# Patient Record
Sex: FEMALE | Race: WHITE | NOT HISPANIC OR LATINO | Employment: FULL TIME | URBAN - METROPOLITAN AREA
[De-identification: names, ages, dates, MRNs, and addresses within clinical notes are randomized per-mention and may not be internally consistent; named-entity substitution may affect disease eponyms.]

---

## 2017-01-06 ENCOUNTER — TRANSCRIBE ORDERS (OUTPATIENT)
Dept: ADMINISTRATIVE | Facility: HOSPITAL | Age: 37
End: 2017-01-06

## 2017-01-06 DIAGNOSIS — M25.861 CYST OF RIGHT KNEE JOINT: Primary | ICD-10-CM

## 2017-01-12 ENCOUNTER — HOSPITAL ENCOUNTER (OUTPATIENT)
Dept: RADIOLOGY | Age: 37
Discharge: HOME | End: 2017-01-12
Attending: FAMILY MEDICINE
Payer: COMMERCIAL

## 2017-01-12 DIAGNOSIS — M25.861 CYST OF RIGHT KNEE JOINT: ICD-10-CM

## 2017-01-12 PROCEDURE — 73721 MRI JNT OF LWR EXTRE W/O DYE: CPT

## 2017-02-21 ENCOUNTER — APPOINTMENT (OUTPATIENT)
Dept: PHYSICAL THERAPY | Facility: MEDICAL CENTER | Age: 37
End: 2017-02-21
Payer: COMMERCIAL

## 2017-02-21 PROCEDURE — 97161 PT EVAL LOW COMPLEX 20 MIN: CPT

## 2017-02-21 PROCEDURE — 97110 THERAPEUTIC EXERCISES: CPT

## 2017-02-21 PROCEDURE — 97140 MANUAL THERAPY 1/> REGIONS: CPT

## 2017-02-27 ENCOUNTER — APPOINTMENT (OUTPATIENT)
Dept: PHYSICAL THERAPY | Facility: MEDICAL CENTER | Age: 37
End: 2017-02-27
Payer: COMMERCIAL

## 2017-02-27 PROCEDURE — 97110 THERAPEUTIC EXERCISES: CPT

## 2017-02-27 PROCEDURE — 97140 MANUAL THERAPY 1/> REGIONS: CPT

## 2017-03-02 ENCOUNTER — APPOINTMENT (OUTPATIENT)
Dept: PHYSICAL THERAPY | Facility: MEDICAL CENTER | Age: 37
End: 2017-03-02
Payer: COMMERCIAL

## 2017-03-02 PROCEDURE — 97140 MANUAL THERAPY 1/> REGIONS: CPT

## 2017-03-02 PROCEDURE — 97110 THERAPEUTIC EXERCISES: CPT

## 2017-03-02 PROCEDURE — 97112 NEUROMUSCULAR REEDUCATION: CPT

## 2017-03-06 ENCOUNTER — APPOINTMENT (OUTPATIENT)
Dept: PHYSICAL THERAPY | Facility: MEDICAL CENTER | Age: 37
End: 2017-03-06
Payer: COMMERCIAL

## 2017-03-06 PROCEDURE — 97110 THERAPEUTIC EXERCISES: CPT

## 2017-03-06 PROCEDURE — 97112 NEUROMUSCULAR REEDUCATION: CPT

## 2017-03-06 PROCEDURE — 97140 MANUAL THERAPY 1/> REGIONS: CPT

## 2017-03-09 ENCOUNTER — APPOINTMENT (OUTPATIENT)
Dept: PHYSICAL THERAPY | Facility: MEDICAL CENTER | Age: 37
End: 2017-03-09
Payer: COMMERCIAL

## 2017-03-09 PROCEDURE — 97110 THERAPEUTIC EXERCISES: CPT

## 2017-03-09 PROCEDURE — 97140 MANUAL THERAPY 1/> REGIONS: CPT

## 2017-03-13 ENCOUNTER — APPOINTMENT (OUTPATIENT)
Dept: PHYSICAL THERAPY | Facility: MEDICAL CENTER | Age: 37
End: 2017-03-13
Payer: COMMERCIAL

## 2017-03-13 PROCEDURE — 97140 MANUAL THERAPY 1/> REGIONS: CPT

## 2017-03-13 PROCEDURE — 97110 THERAPEUTIC EXERCISES: CPT

## 2017-03-16 ENCOUNTER — APPOINTMENT (OUTPATIENT)
Dept: PHYSICAL THERAPY | Facility: MEDICAL CENTER | Age: 37
End: 2017-03-16
Payer: COMMERCIAL

## 2017-03-16 PROCEDURE — 97110 THERAPEUTIC EXERCISES: CPT

## 2017-03-16 PROCEDURE — 97140 MANUAL THERAPY 1/> REGIONS: CPT

## 2017-03-16 PROCEDURE — 97112 NEUROMUSCULAR REEDUCATION: CPT

## 2017-03-20 ENCOUNTER — APPOINTMENT (OUTPATIENT)
Dept: PHYSICAL THERAPY | Facility: MEDICAL CENTER | Age: 37
End: 2017-03-20
Payer: COMMERCIAL

## 2017-03-20 PROCEDURE — 97110 THERAPEUTIC EXERCISES: CPT

## 2017-03-20 PROCEDURE — 97112 NEUROMUSCULAR REEDUCATION: CPT

## 2017-03-20 PROCEDURE — 97140 MANUAL THERAPY 1/> REGIONS: CPT

## 2017-03-23 ENCOUNTER — APPOINTMENT (OUTPATIENT)
Dept: PHYSICAL THERAPY | Facility: MEDICAL CENTER | Age: 37
End: 2017-03-23
Payer: COMMERCIAL

## 2017-03-27 ENCOUNTER — APPOINTMENT (OUTPATIENT)
Dept: PHYSICAL THERAPY | Facility: MEDICAL CENTER | Age: 37
End: 2017-03-27
Payer: COMMERCIAL

## 2017-03-27 ENCOUNTER — ALLSCRIPTS OFFICE VISIT (OUTPATIENT)
Dept: OTHER | Facility: OTHER | Age: 37
End: 2017-03-27

## 2017-03-27 PROCEDURE — 97140 MANUAL THERAPY 1/> REGIONS: CPT

## 2017-03-27 PROCEDURE — 97110 THERAPEUTIC EXERCISES: CPT

## 2017-03-27 PROCEDURE — 97112 NEUROMUSCULAR REEDUCATION: CPT

## 2017-07-14 ENCOUNTER — TRANSCRIBE ORDERS (OUTPATIENT)
Dept: ADMINISTRATIVE | Facility: HOSPITAL | Age: 37
End: 2017-07-14

## 2017-07-14 DIAGNOSIS — R00.2 RAPID PALPITATIONS: Primary | ICD-10-CM

## 2017-07-14 DIAGNOSIS — D21.9 PARASITIC FIBROID: ICD-10-CM

## 2017-07-31 ENCOUNTER — HOSPITAL ENCOUNTER (OUTPATIENT)
Dept: NON INVASIVE DIAGNOSTICS | Facility: CLINIC | Age: 37
Discharge: HOME | End: 2017-07-31
Payer: COMMERCIAL

## 2017-07-31 ENCOUNTER — GENERIC CONVERSION - ENCOUNTER (OUTPATIENT)
Dept: OTHER | Facility: OTHER | Age: 37
End: 2017-07-31

## 2017-07-31 DIAGNOSIS — R00.2 RAPID PALPITATIONS: ICD-10-CM

## 2017-07-31 PROCEDURE — 93225 XTRNL ECG REC<48 HRS REC: CPT

## 2017-07-31 PROCEDURE — 93306 TTE W/DOPPLER COMPLETE: CPT

## 2017-07-31 PROCEDURE — 93226 XTRNL ECG REC<48 HR SCAN A/R: CPT

## 2017-08-09 ENCOUNTER — TRANSCRIBE ORDERS (OUTPATIENT)
Dept: ADMINISTRATIVE | Facility: HOSPITAL | Age: 37
End: 2017-08-09

## 2017-08-09 DIAGNOSIS — E04.0 GOITER, SPECIFIED AS SIMPLE: Primary | ICD-10-CM

## 2017-08-12 ENCOUNTER — HOSPITAL ENCOUNTER (OUTPATIENT)
Dept: RADIOLOGY | Facility: MEDICAL CENTER | Age: 37
Discharge: HOME | End: 2017-08-12
Payer: COMMERCIAL

## 2017-08-12 DIAGNOSIS — E04.0 GOITER, SPECIFIED AS SIMPLE: ICD-10-CM

## 2017-08-12 PROCEDURE — 76536 US EXAM OF HEAD AND NECK: CPT

## 2017-08-29 ENCOUNTER — ALLSCRIPTS OFFICE VISIT (OUTPATIENT)
Dept: OTHER | Facility: OTHER | Age: 37
End: 2017-08-29

## 2017-08-29 DIAGNOSIS — R93.1 ABNORMAL FINDINGS ON DIAGNOSTIC IMAGING OF HEART AND CORONARY CIRCULATION: ICD-10-CM

## 2017-09-13 ENCOUNTER — HOSPITAL ENCOUNTER (OUTPATIENT)
Dept: NON INVASIVE DIAGNOSTICS | Facility: CLINIC | Age: 37
Discharge: HOME | End: 2017-09-13
Payer: COMMERCIAL

## 2017-09-13 DIAGNOSIS — R93.1 ABNORMAL FINDINGS ON DIAGNOSTIC IMAGING OF HEART AND CORONARY CIRCULATION: ICD-10-CM

## 2017-09-13 LAB
ARRHY DURING EX: NORMAL
CHEST PAIN STATEMENT: NORMAL
MAX DIASTOLIC BP: 98 MMHG
MAX HEART RATE: 193 BPM
MAX PREDICTED HEART RATE: 182 BPM
MAX. SYSTOLIC BP: 162 MMHG
PROTOCOL NAME: NORMAL
TARGET HR FORMULA: NORMAL
TEST INDICATION: NORMAL
TIME IN EXERCISE PHASE: 565 S

## 2017-09-13 PROCEDURE — 93017 CV STRESS TEST TRACING ONLY: CPT

## 2017-09-21 ENCOUNTER — HOSPITAL ENCOUNTER (OUTPATIENT)
Dept: NON INVASIVE DIAGNOSTICS | Facility: HOSPITAL | Age: 37
Discharge: HOME | End: 2017-09-21
Attending: INTERNAL MEDICINE
Payer: COMMERCIAL

## 2017-09-21 ENCOUNTER — ANESTHESIA (OUTPATIENT)
Dept: NON INVASIVE DIAGNOSTICS | Facility: HOSPITAL | Age: 37
End: 2017-09-21

## 2017-09-21 ENCOUNTER — ANESTHESIA EVENT (OUTPATIENT)
Dept: NON INVASIVE DIAGNOSTICS | Facility: HOSPITAL | Age: 37
End: 2017-09-21

## 2017-09-21 VITALS
SYSTOLIC BLOOD PRESSURE: 120 MMHG | OXYGEN SATURATION: 97 % | RESPIRATION RATE: 20 BRPM | DIASTOLIC BLOOD PRESSURE: 80 MMHG | HEART RATE: 73 BPM

## 2017-09-21 DIAGNOSIS — R93.1 ABNORMAL ECHOCARDIOGRAM: ICD-10-CM

## 2017-09-21 PROCEDURE — 93312 ECHO TRANSESOPHAGEAL: CPT

## 2017-09-21 RX ORDER — SODIUM CHLORIDE 9 MG/ML
INJECTION, SOLUTION INTRAVENOUS CONTINUOUS PRN
Status: DISCONTINUED | OUTPATIENT
Start: 2017-09-21 | End: 2017-09-21 | Stop reason: SURG

## 2017-09-21 RX ORDER — ATROPINE SULFATE 0.4 MG/ML
INJECTION, SOLUTION ENDOTRACHEAL; INTRAMEDULLARY; INTRAMUSCULAR; INTRAVENOUS; SUBCUTANEOUS AS NEEDED
Status: DISCONTINUED | OUTPATIENT
Start: 2017-09-21 | End: 2017-09-21 | Stop reason: SURG

## 2017-09-21 RX ORDER — PROPOFOL 10 MG/ML
INJECTION, EMULSION INTRAVENOUS AS NEEDED
Status: DISCONTINUED | OUTPATIENT
Start: 2017-09-21 | End: 2017-09-21 | Stop reason: SURG

## 2017-09-21 RX ADMIN — PROPOFOL 50 MG: 10 INJECTION, EMULSION INTRAVENOUS at 08:22

## 2017-09-21 RX ADMIN — PROPOFOL 150 MG: 10 INJECTION, EMULSION INTRAVENOUS at 08:09

## 2017-09-21 RX ADMIN — PROPOFOL 50 MG: 10 INJECTION, EMULSION INTRAVENOUS at 08:18

## 2017-09-21 RX ADMIN — ATROPINE SULFATE 0.2 MG: 0.4 INJECTION INTRAMUSCULAR; INTRAVENOUS; SUBCUTANEOUS at 08:20

## 2017-09-21 RX ADMIN — PROPOFOL 50 MG: 10 INJECTION, EMULSION INTRAVENOUS at 08:14

## 2017-09-21 RX ADMIN — SODIUM CHLORIDE: 0.9 INJECTION, SOLUTION INTRAVENOUS at 07:57

## 2018-01-12 VITALS
HEIGHT: 64 IN | BODY MASS INDEX: 37.39 KG/M2 | HEART RATE: 78 BPM | SYSTOLIC BLOOD PRESSURE: 122 MMHG | DIASTOLIC BLOOD PRESSURE: 80 MMHG | WEIGHT: 219 LBS

## 2018-01-13 VITALS
DIASTOLIC BLOOD PRESSURE: 84 MMHG | WEIGHT: 219 LBS | HEIGHT: 64 IN | BODY MASS INDEX: 37.39 KG/M2 | SYSTOLIC BLOOD PRESSURE: 120 MMHG

## 2018-01-13 NOTE — PROCEDURES
Procedures by Karyna Lake MD  at 2017 11:59 PM      Author:  Karyna Lake MD Service:  Cardiology Author Type:  Physician    Filed:  2017  7:01 PM Date of Service:  2017 11:59 PM Status:  Signed    :  Karyna Lake MD (Physician)        Procedures:       1  HOLTER MONITOR - 50 HOUR [id number] (Custom)]                 PT NAME: Phylicia Lnadaverde  : 1980  AGE: 43 y o  GENDER: female  MRN: [id number]  PROCEDURE: Holter monitor - 48 hour      INDICATIONS:   48 hour Holter monitor was performed Palpitations     PROCEDURE:    Average heart rate was 70 beats; minimum heart rate was sinus bradycardia at 42 BPM at 6:05 AM; and maximum heart rate was sinus tachycardia at 119 BPM at 7:43 PM      Patient had no PVCs    Supraventricular ectopic activity consisted of 299, which comprises 0 2% of total beats  Output which 265 were laid beats  32 were single PACs  The patient's rhythm included 22 minutes and 27 seconds of tachycardia  The fastest single episode of tachycardia occurred at 7:43 PM, lasting 35 second, with maximum HR of 120 bpm     Patient's rhythm included 10 hours and 58 minutes of bradycardia the slowest single episode of bradycardia occurred at 6:04 AM lasting 3 minutes and 35 second with a minimum heart rate 42 bpm      Patient's diary included symptoms of Fluttering and palpitations  Corresponding events on monitor was Normal sinus rhythm with heart rate around 70-80 bpm  Corresponding to patient's symptoms of hardening feeling in the neck patient's heart rate  was noted to be 100 bpm with baseline artifacts noted at that time  IMPRESSION:  1  Normal 48 hour holter tracing report No significant tachy-bradycardia arrhythmias  Most of the bradycardia noted during sleep hours  2  Rhythm was sinus throughout monitoring period  3  There were 299 episodes of Supraventricular ectopic activity  4  There were no episodes of Ventricular ectopic activity          This note has been constructed using a voice recognition system  Therefore there may be syntax, spelling, and/or grammatical errors  Please call if you have any questions  Roge CROOKS    Aug  4 2017  7:01PM [place] Standard Time

## 2018-03-19 ENCOUNTER — OFFICE VISIT (OUTPATIENT)
Dept: OBGYN CLINIC | Facility: CLINIC | Age: 38
End: 2018-03-19
Payer: COMMERCIAL

## 2018-03-19 VITALS
SYSTOLIC BLOOD PRESSURE: 120 MMHG | HEIGHT: 64 IN | BODY MASS INDEX: 38.07 KG/M2 | DIASTOLIC BLOOD PRESSURE: 84 MMHG | WEIGHT: 223 LBS

## 2018-03-19 DIAGNOSIS — Z01.419 WOMEN'S ANNUAL ROUTINE GYNECOLOGICAL EXAMINATION: Primary | ICD-10-CM

## 2018-03-19 PROBLEM — R93.1 ABNORMAL ECHOCARDIOGRAM: Status: ACTIVE | Noted: 2017-08-29

## 2018-03-19 PROCEDURE — 99395 PREV VISIT EST AGE 18-39: CPT | Performed by: OBSTETRICS & GYNECOLOGY

## 2018-03-19 NOTE — PROGRESS NOTES
Subjective  HPI:     Jolly Serna is a 42 y o  female  She is a  2 Para 2, with 2 prior vaginal deliveries  Her menstrual cycles are regular and predictable, heavy on day one and then lightens  Lasting 3-4 days  Her current method of contraception includes condoms and withdrawal  She is still trying to convince her spouse to have a vasectomy  She denies issues with intimacy  She denies /GI and Gyn complaints  She denies depression/anxiety  She denies any new medical or family stressor/illness to report  Her dental care is up-to-date  Vaccines are up-to-date  Gynecologic History    Patient's last menstrual period was 2018 (exact date)  Last Pap: 2016  Results were: normal    Obstetric History    OB History    Para Term  AB Living   2 2       2   SAB TAB Ectopic Multiple Live Births           2      # Outcome Date GA Lbr Tim/2nd Weight Sex Delivery Anes PTL Lv   2 Para 09    F Vag-Spont      1 Para 06    M Vag-Spont             The following portions of the patient's history were reviewed and updated as appropriate: allergies, current medications, past family history, past medical history, past social history, past surgical history and problem list     Review of Systems    Pertinent items are noted in HPI  Objective    Physical Exam   Constitutional: Vital signs are normal  She appears well-developed and well-nourished  Genitourinary: Vagina normal and uterus normal  Pelvic exam was performed with patient supine  There is no rash, tenderness, lesion or Bartholin's cyst on the right labia  There is no rash, tenderness, lesion or Bartholin's cyst on the left labia  Right adnexum does not display mass, does not display tenderness and does not display fullness  Left adnexum does not display mass, does not display tenderness and does not display fullness  Cervix is parous   Cervix does not exhibit motion tenderness, lesion, discharge, friability, polyp or nabothian cyst      Uterus is anteverted  Genitourinary Comments: Vaginal walls are pale appearing  HENT:   Head: Normocephalic and atraumatic  Neck: Neck supple  No thyromegaly present  Cardiovascular: Normal rate, regular rhythm, S1 normal, S2 normal and normal heart sounds  Pulmonary/Chest: Effort normal and breath sounds normal  Right breast exhibits no inverted nipple, no mass, no nipple discharge, no skin change and no tenderness  Left breast exhibits no inverted nipple, no mass, no nipple discharge, no skin change and no tenderness  Abdominal: Soft  Bowel sounds are normal  She exhibits no distension and no mass  There is no tenderness  There is no guarding  Lymphadenopathy:     She has no cervical adenopathy  She has no axillary adenopathy  Neurological: She is alert  Skin: Skin is warm  Psychiatric: She has a normal mood and affect  Nursing note and vitals reviewed  Assessment and Plan    Patient informed of a Stable GYN exam  A pap smear was not performed due to negative HPV pap in 2016  The current ASCCP guidelines were reviewed  The low risk patient will receive pap smear screening every 3 years  I emphasized the importance of an annual pelvic and breast exam  A yearly mammogram is recommended for breast cancer screening starting at age 36  All questions have been answered to her satisfaction  Contraception: condoms  Follow up in: 1 year or sooner if needed

## 2018-09-27 ENCOUNTER — OFFICE VISIT (OUTPATIENT)
Dept: CARDIOLOGY CLINIC | Facility: CLINIC | Age: 38
End: 2018-09-27
Payer: COMMERCIAL

## 2018-09-27 VITALS
WEIGHT: 217.4 LBS | HEIGHT: 64 IN | SYSTOLIC BLOOD PRESSURE: 112 MMHG | BODY MASS INDEX: 37.11 KG/M2 | HEART RATE: 67 BPM | DIASTOLIC BLOOD PRESSURE: 74 MMHG

## 2018-09-27 DIAGNOSIS — R00.2 PALPITATIONS: ICD-10-CM

## 2018-09-27 DIAGNOSIS — I49.1 PAC (PREMATURE ATRIAL CONTRACTION): Primary | ICD-10-CM

## 2018-09-27 PROCEDURE — 93000 ELECTROCARDIOGRAM COMPLETE: CPT | Performed by: INTERNAL MEDICINE

## 2018-09-27 PROCEDURE — 99213 OFFICE O/P EST LOW 20 MIN: CPT | Performed by: INTERNAL MEDICINE

## 2018-09-27 NOTE — PROGRESS NOTES
Cardiology Follow Up    Kesha Hernandez [facility]  1980  [id number]  [address] [facility]  [address]    1  PAC (premature atrial contraction)  POCT ECG   2  Palpitations  POCT ECG       Interval History:   Cardiology follow-up  Patient continues to do well, mostly asymptomatic were rare palpitations  No syncope or presyncope  Denies any chest pain or dyspnea, active but no regular exercise  Patient Active Problem List   Diagnosis    Abnormal echocardiogram    Palpitations    PAC (premature atrial contraction)     Past Medical History:   Diagnosis Date    Abnormal ECG      Social History     Social History    Marital status: /Civil Union     Spouse name: N/A    Number of children: N/A    Years of education: N/A     Occupational History    Not on file  Social History Main Topics    Smoking status: Never Smoker    Smokeless tobacco: Never Used    Alcohol use Yes      Comment: social    Drug use: No    Sexual activity: Yes     Partners: Male     Birth control/ protection: Condom      Comment: [place]     Other Topics Concern    Not on file     Social History Narrative    No narrative on file      Family History   Problem Relation Age of Onset    Hyperlipidemia Mother     Lymphoma Father     Alcohol abuse Other     Cancer Other     Heart attack Other      No past surgical history on file  No current outpatient prescriptions on file  No Known Allergies    Labs:  No visits with results within 6 Month(s) from this visit  Latest known visit with results is:   Hospital Outpatient Visit on 09/13/2017   Component Date Value    Protocol Name 09/13/2017 Xiao Magana Time In Exercise Phase 09/13/2017 565     MAX   SYSTOLIC BP 01/38/5251 136     Max Diastolic Bp 23/14/3121 98     Max Heart Rate 09/13/2017 193     Max Predicted Heart Rate 09/13/2017 182     Reason for Termination 09/13/2017                      Value:Fatigue  Dyspnea      Test Indication 09/13/2017 ABN ECHO     Target Hr Formular 09/13/2017 (220 - Age)*100%     Arrhy During Ex 09/13/2017 ventricular premature beats-isolated     Chest Pain Statement 09/13/2017 none      Imaging: No results found  Review of Systems:  Review of Systems   Constitutional: Negative for activity change  Respiratory: Negative for apnea, shortness of breath, wheezing and stridor  Cardiovascular: Positive for palpitations  Negative for chest pain and leg swelling  Neurological: Negative for syncope  Hematological: Does not bruise/bleed easily  Psychiatric/Behavioral: Negative for sleep disturbance  Physical Exam:  Physical Exam   Constitutional: She appears well-developed  No distress  Eyes: No scleral icterus  Neck: No JVD present  Cardiovascular: Normal rate, regular rhythm, normal heart sounds and intact distal pulses  Exam reveals no gallop and no friction rub  No murmur heard  Pulmonary/Chest: Effort normal and breath sounds normal  No respiratory distress  She has no wheezes  She has no rales  She exhibits no tenderness  Neurological: She is alert  Skin: She is not diaphoretic  Psychiatric: She has a normal mood and affect  Discussion/Summary:  Palpitations, history of premature atrial contractions, no sustained tachyarrhythmias  Not very symptomatic at the present time  Previous echocardiogram suggest left ventricular hypertrophy, which was not corroborated on follow-up CHRISTY scan which was completely normal, stress test at high workload revealed no ischemia  The present time no new recommendations or restrictions, follow-up nicol Rodriguez

## 2019-03-19 ENCOUNTER — ANNUAL EXAM (OUTPATIENT)
Dept: OBGYN CLINIC | Facility: CLINIC | Age: 39
End: 2019-03-19
Payer: COMMERCIAL

## 2019-03-19 VITALS
WEIGHT: 224.8 LBS | DIASTOLIC BLOOD PRESSURE: 80 MMHG | SYSTOLIC BLOOD PRESSURE: 110 MMHG | HEIGHT: 64 IN | BODY MASS INDEX: 38.38 KG/M2

## 2019-03-19 DIAGNOSIS — Z12.39 BREAST CANCER SCREENING: ICD-10-CM

## 2019-03-19 DIAGNOSIS — Z01.419 WOMEN'S ANNUAL ROUTINE GYNECOLOGICAL EXAMINATION: Primary | ICD-10-CM

## 2019-03-19 PROCEDURE — S0612 ANNUAL GYNECOLOGICAL EXAMINA: HCPCS | Performed by: OBSTETRICS & GYNECOLOGY

## 2019-03-19 RX ORDER — AMOXICILLIN AND CLAVULANATE POTASSIUM 875; 125 MG/1; MG/1
TABLET, FILM COATED ORAL
Refills: 0 | COMMUNITY
Start: 2019-03-12

## 2019-03-19 NOTE — PROGRESS NOTES
HPI   this is a 75-year-old white female, she is a  2 para 2 with 2 prior vaginal deliveries  Her current method of contraception includes withdrawal and condoms  Her menstrual cycles are regular and predictable  She denies any problem with intimacy  She denies any problem with bladder control  She denies any major  GI complaint  She denies any problems with anxiety or depression  She has a dentist on a regular basis  She is not happy with her weight  We had a discussion about weight loss  There are no new major family illnesses report this time  REVIEW OF SYSTEMS   patient denies any current issues, she did have a history of palpitations Cardiology workup was negative  PAST MEDICAL HISTORY  history of palpitations, history of increased BMI    PAST SURGICAL HISTORY   the patient denies any major abdominal pelvic surgery  FAMILY HISTORY  family history significant for elevated cholesterol, bone cancer, lymphoma, heart disease    SOCIAL HISTORY   negative for tobacco positive for social alcohol    PHYSICAL EXAM   this is a well-developed well-nourished white female acute distress, her BMI is 38 6  Her HEENT is was within normal limits  Her neck is supple no masses thyroid normal size  Cardiac exam shows a regular rhythm and rate normal S1-S2  There is no gallop there is no evidence of palpitation today's exam   Her lungs clear auscultation bilaterally  Breast exam symmetrical nontender no retraction or dimpling as adnexa clear bilaterally  Her abdomen is soft nontender no masses positive bowel sound  There is no evidence of any prior abdominal surgery  Pelvic exam the external genitalia normal limits the cervix is parous  It is closed  Uterus is retroverted normal size  There is no cervical motion tenderness  The adnexa clear bilaterally  A Pap smear was performed  IMPRESSION   the patient was informed of a stable gyn examination  Pap smear was performed    She was counseled about trying to lose more weight with diet exercising water intake she will make a more conscious effort  She will get her 1st screening mammogram this year  She return my office in 1 year

## 2019-03-19 NOTE — PATIENT INSTRUCTIONS
The patient was informed of a stable gyn examination  We had a discussion about trying to lose more weight  She went arrangements for 1st screening mammogram   She should return my office in 1 year  She will do use condoms for contraception

## 2019-03-21 LAB
CLINICAL INFO: NORMAL
CYTO CVX: NORMAL
CYTOLOGY CMNT CVX/VAG CYTO-IMP: NORMAL
DATE PREVIOUS BX: NORMAL
HPV E6+E7 MRNA CVX QL NAA+PROBE: NOT DETECTED
LMP START DATE: NORMAL
SL AMB PREV. PAP:: NORMAL
SPECIMEN SOURCE CVX/VAG CYTO: NORMAL

## 2019-04-10 ENCOUNTER — HOSPITAL ENCOUNTER (OUTPATIENT)
Dept: RADIOLOGY | Facility: MEDICAL CENTER | Age: 39
Discharge: HOME | End: 2019-04-10
Payer: COMMERCIAL

## 2019-04-10 VITALS — HEIGHT: 64 IN | WEIGHT: 224 LBS | BODY MASS INDEX: 38.24 KG/M2

## 2019-04-10 DIAGNOSIS — Z12.39 BREAST CANCER SCREENING: ICD-10-CM

## 2019-04-10 PROCEDURE — 77067 SCR MAMMO BI INCL CAD: CPT

## 2019-10-14 ENCOUNTER — TELEPHONE (OUTPATIENT)
Dept: OBGYN CLINIC | Facility: CLINIC | Age: 39
End: 2019-10-14

## 2019-10-14 DIAGNOSIS — N76.0 ACUTE VAGINITIS: Primary | ICD-10-CM

## 2019-10-14 RX ORDER — METRONIDAZOLE 500 MG/1
500 TABLET ORAL EVERY 12 HOURS SCHEDULED
Qty: 10 TABLET | Refills: 0 | Status: SHIPPED | OUTPATIENT
Start: 2019-10-14 | End: 2019-10-19

## 2019-10-14 NOTE — TELEPHONE ENCOUNTER
Pt having clear-yellow mucousy vag disch with odor, some itching past wk, no recent antibiotic use  tx with Monistat 3 2 months ago then again with Monistat 3 approx 3-4 wk ago  Do you want to tx as BV?

## 2020-10-14 ENCOUNTER — ANNUAL EXAM (OUTPATIENT)
Dept: OBGYN CLINIC | Facility: CLINIC | Age: 40
End: 2020-10-14
Payer: COMMERCIAL

## 2020-10-14 VITALS
TEMPERATURE: 98.4 F | SYSTOLIC BLOOD PRESSURE: 120 MMHG | DIASTOLIC BLOOD PRESSURE: 84 MMHG | WEIGHT: 237.8 LBS | BODY MASS INDEX: 40.6 KG/M2 | HEIGHT: 64 IN

## 2020-10-14 DIAGNOSIS — Z12.31 ENCOUNTER FOR SCREENING MAMMOGRAM FOR MALIGNANT NEOPLASM OF BREAST: Primary | ICD-10-CM

## 2020-10-14 PROCEDURE — S0612 ANNUAL GYNECOLOGICAL EXAMINA: HCPCS | Performed by: OBSTETRICS & GYNECOLOGY

## 2021-01-25 ENCOUNTER — NURSE TRIAGE (OUTPATIENT)
Dept: OTHER | Facility: OTHER | Age: 41
End: 2021-01-25

## 2021-01-25 DIAGNOSIS — Z20.828 SARS-ASSOCIATED CORONAVIRUS EXPOSURE: Primary | ICD-10-CM

## 2021-01-25 NOTE — TELEPHONE ENCOUNTER
1  Were you within 6 feet or less, for up to 15 minutes or more with a person that has a confirmed COVID-19 test?   A student tested positive over the weekend  She did not have contact of 6 feet or closer  2  What was the date of your exposure? 1/22/2021 was last date of contact  3  Are you experiencing any symptoms attributed to the virus?  (Assess for SOB, cough, fever, difficulty breathing)   Started 1/23/2021  Pressure in head  Ear popping  Sinus congestion  Denies fever  Intermittent cough due to postnasal drip  4  HIGH RISK: Do you have any history heart or lung conditions, weakened immune system, diabetes, Asthma, CHF, HIV, COPD, Chemo, renal failure, sickle cell, etc?   Denied  5  PREGNANCY: Are you pregnant or did you recently give birth?    Denied

## 2021-01-25 NOTE — TELEPHONE ENCOUNTER
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

## 2021-01-25 NOTE — TELEPHONE ENCOUNTER
Reason for Disposition   [1] COVID-19 infection suspected by caller or triager AND [2] mild symptoms (cough, fever, or others) AND [8] no complications or SOB    Additional Information   [1] Symptoms of COVID-19 (e g , cough, fever, SOB, or others) AND [2] lives in an area with community spread    Protocols used: CORONAVIRUS (COVID-19) DIAGNOSED OR SUSPECTED-ADULT-[place], CORONAVIRUS (COVID-19) EXPOSURE-ADULT-[place]

## 2021-01-27 DIAGNOSIS — Z20.828 SARS-ASSOCIATED CORONAVIRUS EXPOSURE: ICD-10-CM

## 2021-01-27 PROCEDURE — U0005 INFEC AGEN DETEC AMPLI PROBE: HCPCS | Performed by: FAMILY MEDICINE

## 2021-01-27 PROCEDURE — U0003 INFECTIOUS AGENT DETECTION BY NUCLEIC ACID (DNA OR RNA); SEVERE ACUTE RESPIRATORY SYNDROME CORONAVIRUS 2 (SARS-COV-2) (CORONAVIRUS DISEASE [COVID-19]), AMPLIFIED PROBE TECHNIQUE, MAKING USE OF HIGH THROUGHPUT TECHNOLOGIES AS DESCRIBED BY CMS-2020-01-R: HCPCS | Performed by: FAMILY MEDICINE

## 2021-01-28 ENCOUNTER — TELEPHONE (OUTPATIENT)
Dept: OTHER | Facility: OTHER | Age: 41
End: 2021-01-28

## 2021-01-28 LAB — SARS-COV-2 RNA RESP QL NAA+PROBE: POSITIVE

## 2021-01-29 NOTE — TELEPHONE ENCOUNTER
Your test for the novel coronavirus, also known as COVID-19, was positive  The sample showed that the virus was present  Positive COVID-19 test results are reportable to the [place] Department of Health  You may receive a call from trained public health staff to conduct an interview  It is important to answer their call  They will ask you to verify who you are  During the call they will ask you about what symptoms you have, what you did before you got sick, and who you were close to while sick  The health department does this to make sure everyone stays healthy and to reduce the spread of the virus  If you would like to verify if the caller does in fact work in contact tracing, call the [address] [place] [address] at [employer] ([phone])  For additional information, please visit the [employer]  website: [email or web address] health [email or web address] gov     If you have any additional questions, we can schedule a virtual visit for you with a provider or call the [facility] hotline [phone], option 7, for care advice    For additional information, please visit the Coronavirus FAQ on the [employer] home page ([employer])

## 2021-01-29 NOTE — TELEPHONE ENCOUNTER
Post-nasal-drip, congestion, and headache  Denies SOB and chest pain  Patient reports she has an out of network PCP that she can follow up with

## 2021-03-26 DIAGNOSIS — Z23 ENCOUNTER FOR IMMUNIZATION: ICD-10-CM

## 2021-05-13 ENCOUNTER — HOSPITAL ENCOUNTER (OUTPATIENT)
Dept: RADIOLOGY | Facility: MEDICAL CENTER | Age: 41
Discharge: HOME | End: 2021-05-13
Payer: COMMERCIAL

## 2021-05-13 VITALS — HEIGHT: 64 IN | BODY MASS INDEX: 40.46 KG/M2 | WEIGHT: 237 LBS

## 2021-05-13 DIAGNOSIS — Z12.31 ENCOUNTER FOR SCREENING MAMMOGRAM FOR MALIGNANT NEOPLASM OF BREAST: ICD-10-CM

## 2021-05-13 PROCEDURE — 77067 SCR MAMMO BI INCL CAD: CPT

## 2021-05-13 PROCEDURE — 77063 BREAST TOMOSYNTHESIS BI: CPT

## 2021-11-08 ENCOUNTER — ANNUAL EXAM (OUTPATIENT)
Dept: OBGYN CLINIC | Facility: CLINIC | Age: 41
End: 2021-11-08
Payer: COMMERCIAL

## 2021-11-08 VITALS
BODY MASS INDEX: 40.8 KG/M2 | SYSTOLIC BLOOD PRESSURE: 124 MMHG | DIASTOLIC BLOOD PRESSURE: 80 MMHG | HEIGHT: 64 IN | WEIGHT: 239 LBS

## 2021-11-08 DIAGNOSIS — Z12.31 ENCOUNTER FOR SCREENING MAMMOGRAM FOR MALIGNANT NEOPLASM OF BREAST: ICD-10-CM

## 2021-11-08 DIAGNOSIS — Z01.419 WOMEN'S ANNUAL ROUTINE GYNECOLOGICAL EXAMINATION: ICD-10-CM

## 2021-11-08 DIAGNOSIS — N92.0 MENORRHAGIA WITH REGULAR CYCLE: Primary | ICD-10-CM

## 2021-11-08 PROCEDURE — S0612 ANNUAL GYNECOLOGICAL EXAMINA: HCPCS | Performed by: OBSTETRICS & GYNECOLOGY

## 2021-11-08 RX ORDER — TRANEXAMIC ACID 650 1/1
TABLET ORAL
Qty: 30 TABLET | Refills: 3 | Status: SHIPPED | OUTPATIENT
Start: 2021-11-08

## 2022-07-30 ENCOUNTER — OFFICE VISIT (OUTPATIENT)
Dept: URGENT CARE | Facility: MEDICAL CENTER | Age: 42
End: 2022-07-30
Payer: COMMERCIAL

## 2022-07-30 VITALS
SYSTOLIC BLOOD PRESSURE: 148 MMHG | HEART RATE: 94 BPM | TEMPERATURE: 97.6 F | WEIGHT: 246.2 LBS | OXYGEN SATURATION: 97 % | HEIGHT: 64 IN | DIASTOLIC BLOOD PRESSURE: 87 MMHG | BODY MASS INDEX: 42.03 KG/M2 | RESPIRATION RATE: 18 BRPM

## 2022-07-30 DIAGNOSIS — H66.91 RIGHT OTITIS MEDIA, UNSPECIFIED OTITIS MEDIA TYPE: Primary | ICD-10-CM

## 2022-07-30 PROCEDURE — 99213 OFFICE O/P EST LOW 20 MIN: CPT | Performed by: PHYSICIAN ASSISTANT

## 2022-07-30 RX ORDER — AMOXICILLIN 500 MG/1
500 CAPSULE ORAL EVERY 8 HOURS SCHEDULED
Qty: 21 CAPSULE | Refills: 0 | Status: SHIPPED | OUTPATIENT
Start: 2022-07-30 | End: 2022-08-06

## 2022-07-30 NOTE — PROGRESS NOTES
[facility]        NAME: Tuan James is a 35 y o  female  : 1980    MRN: [id number]  DATE: 2022  TIME: 3:58 PM    Assessment and Plan   Right otitis media, unspecified otitis media type [H66 91]  1  Right otitis media, unspecified otitis media type  amoxicillin (AMOXIL) 500 mg capsule         Patient Instructions     Right otitis media   Amoxicillin as directed  Follow up with PCP in 3-5 days  Proceed to  ER if symptoms worsen  Chief Complaint     Chief Complaint   Patient presents with   [facility]     For 7 days, pain         History of Present Illness       40-year-old female who presents complaining of right ear crackling in some pain x3 days  Patient denies fevers, chills, cough, congestion      Review of Systems   Review of Systems   Constitutional: Negative  HENT: Positive for ear pain  Eyes: Negative  Respiratory: Negative  Negative for cough, chest tightness, shortness of breath, wheezing and stridor  Cardiovascular: Negative  Negative for chest pain, palpitations and leg swelling  Current Medications       Current Outpatient Medications:     amoxicillin (AMOXIL) 500 mg capsule, Take 1 capsule (500 mg total) by mouth every 8 (eight) hours for 7 days, Disp: 21 capsule, Rfl: 0    amoxicillin-clavulanate (AUGMENTIN) 875-125 mg per tablet, TAKE 1 TABLET BY MOUTH EVERY DAY TWICE A DAY, Disp: , Rfl: 0    Tranexamic Acid 650 MG TABS, Please take 2 tablets 3 times a day yet and on 1st day of menstrual cycle for total of 5 days   (Patient not taking: Reported on 2022), Disp: 30 tablet, Rfl: 3    Current Allergies     Allergies as of 2022    (No Known Allergies)            The following portions of the patient's history were reviewed and updated as appropriate: allergies, current medications, past family history, past medical history, past social history, past surgical history and problem list      Past Medical History:   Diagnosis Date    Abnormal ECG        History reviewed  No pertinent surgical history  Family History   Problem Relation Age of Onset    Hyperlipidemia Mother     Lymphoma Father 58    Alcohol abuse Other     No Known Problems Brother     Cancer Paternal Grandmother     No Known Problems Daughter     No Known Problems Maternal Grandmother     No Known Problems Maternal Grandfather     No Known Problems Paternal Grandfather     No Known Problems Son          Medications have been verified  Objective   /87   Pulse 94   Temp 97 6 °F (36 4 °C)   Resp 18   Ht 5' 4" (1 626 m)   Wt 112 kg (246 lb 3 2 oz)   SpO2 97%   BMI 42 26 kg/m²        Physical Exam     Physical Exam  Constitutional:       Appearance: Normal appearance  She is well-developed  HENT:      Head: Normocephalic and atraumatic  Right Ear: External ear normal       Left Ear: External ear normal       Mouth/Throat:      Pharynx: No oropharyngeal exudate  Cardiovascular:      Rate and Rhythm: Normal rate and regular rhythm  Heart sounds: Normal heart sounds  Pulmonary:      Effort: Pulmonary effort is normal  No respiratory distress  Breath sounds: Normal breath sounds  No wheezing or rales  Chest:      Chest wall: No tenderness  Abdominal:      Tenderness: There is no rebound  Musculoskeletal:      Cervical back: Normal range of motion and neck supple  Lymphadenopathy:      Cervical: No cervical adenopathy  Neurological:      Mental Status: She is alert

## 2022-07-30 NOTE — PATIENT INSTRUCTIONS
Right otitis media   Amoxicillin as directed  Follow up with PCP in 3-5 days  Proceed to  ER if symptoms worsen  Ear Infection   WHAT YOU NEED TO KNOW:   An ear infection is also called otitis media  Blocked or swollen eustachian tubes can cause an infection  Eustachian tubes connect the middle ear to the back of the nose and throat  They drain fluid from the middle ear  You may have a buildup of fluid in your ear  Germs build up in the fluid and infection develops  DISCHARGE INSTRUCTIONS:   Return to the emergency department if:   You have clear fluid coming from your ear  You have a stiff neck, headache, and a fever  Call your doctor if:   You see blood or pus draining from your ear  Your ear pain gets worse or does not go away, even after treatment  The outside of your ear is red or swollen  You are vomiting or have diarrhea  You have questions or concerns about your condition or care  Medicines: You may  need any of the following:  Acetaminophen  decreases pain and fever  It is available without a doctor's order  Ask how much to take and how often to take it  Follow directions  Read the labels of all other medicines you are using to see if they also contain acetaminophen, or ask your doctor or pharmacist  Acetaminophen can cause liver damage if not taken correctly  Do not use more than 4 grams (4,000 milligrams) total of acetaminophen in one day  NSAIDs , such as ibuprofen, help decrease swelling, pain, and fever  This medicine is available with or without a doctor's order  NSAIDs can cause stomach bleeding or kidney problems in certain people  If you take blood thinner medicine, always ask your healthcare provider if NSAIDs are safe for you  Always read the medicine label and follow directions  Ear drops  may contain medicine to decrease pain and inflammation  Antibiotics  help treat a bacterial infection  Take your medicine as directed    Contact your healthcare provider if you think your medicine is not helping or if you have side effects  Tell him or her if you are allergic to any medicine  Keep a list of the medicines, vitamins, and herbs you take  Include the amounts, and when and why you take them  Bring the list or the pill bottles to follow-up visits  Carry your medicine list with you in case of an emergency  Self-care:   Apply heat  on your ear for 15 to 20 minutes, 3 to 4 times a day or as directed  You can apply heat with an electric heating pad, hot water bottle, or warm compress  Always put a cloth between your skin and the heat pack to prevent burns  Heat helps decrease pain  Apply ice  on your ear for 15 to 20 minutes, 3 to 4 times a day for 2 days or as directed  Use an ice pack, or put crushed ice in a plastic bag  Cover it with a towel before you apply it to your ear  Ice decreases swelling and pain  Prevent an ear infection:   Wash your hands often  to help prevent the spread of germs  Ask everyone in your house to wash their hands with soap and water  Ask them to wash after they use the bathroom or change a diaper  Remind them to wash before they prepare or eat food  Stay away from people who are ill  Some germs spread easily and quickly through contact  Follow up with your doctor as directed:  Write down your questions so you remember to ask them during your visits  © Copyright [employer] 2022 Information is for End User's use only and may not be sold, redistributed or otherwise used for commercial purposes  All illustrations and images included in CareNotes® are the copyrighted property of A D A M , Inc  or Sunita Jackson  The above information is an  only  It is not intended as medical advice for individual conditions or treatments  Talk to your doctor, nurse or pharmacist before following any medical regimen to see if it is safe and effective for you

## 2022-08-16 ENCOUNTER — HOSPITAL ENCOUNTER (OUTPATIENT)
Dept: MAMMOGRAPHY | Facility: HOSPITAL | Age: 42
Discharge: HOME | End: 2022-08-16
Attending: OBSTETRICS & GYNECOLOGY
Payer: COMMERCIAL

## 2022-08-16 VITALS — WEIGHT: 246.91 LBS | HEIGHT: 64 IN | BODY MASS INDEX: 42.15 KG/M2

## 2022-08-16 DIAGNOSIS — Z12.31 ENCOUNTER FOR SCREENING MAMMOGRAM FOR MALIGNANT NEOPLASM OF BREAST: ICD-10-CM

## 2022-08-16 PROCEDURE — 77063 BREAST TOMOSYNTHESIS BI: CPT

## 2022-08-16 PROCEDURE — 77067 SCR MAMMO BI INCL CAD: CPT

## 2022-09-02 ENCOUNTER — HOSPITAL ENCOUNTER (OUTPATIENT)
Dept: MAMMOGRAPHY | Facility: CLINIC | Age: 42
End: 2022-09-02
Payer: COMMERCIAL

## 2022-09-02 ENCOUNTER — HOSPITAL ENCOUNTER (OUTPATIENT)
Dept: ULTRASOUND IMAGING | Facility: CLINIC | Age: 42
Discharge: HOME | End: 2022-09-02

## 2022-09-02 VITALS — HEIGHT: 64 IN | WEIGHT: 246 LBS | BODY MASS INDEX: 42 KG/M2

## 2022-09-02 DIAGNOSIS — R92.8 ABNORMAL MAMMOGRAM: ICD-10-CM

## 2022-09-02 PROCEDURE — 77065 DX MAMMO INCL CAD UNI: CPT

## 2022-09-02 PROCEDURE — G0279 TOMOSYNTHESIS, MAMMO: HCPCS

## 2022-11-09 ENCOUNTER — ANNUAL EXAM (OUTPATIENT)
Dept: OBGYN CLINIC | Facility: CLINIC | Age: 42
End: 2022-11-09

## 2022-11-09 VITALS
HEIGHT: 64 IN | BODY MASS INDEX: 39.54 KG/M2 | WEIGHT: 231.6 LBS | SYSTOLIC BLOOD PRESSURE: 140 MMHG | DIASTOLIC BLOOD PRESSURE: 90 MMHG

## 2022-11-09 DIAGNOSIS — Z01.419 WOMEN'S ANNUAL ROUTINE GYNECOLOGICAL EXAMINATION: ICD-10-CM

## 2022-11-09 DIAGNOSIS — Z12.31 ENCOUNTER FOR SCREENING MAMMOGRAM FOR MALIGNANT NEOPLASM OF BREAST: ICD-10-CM

## 2022-11-09 DIAGNOSIS — N92.0 MENORRHAGIA WITH REGULAR CYCLE: Primary | ICD-10-CM

## 2022-11-09 NOTE — PROGRESS NOTES
Assessment/Plan:    Patient was informed of a stable gyn examination  We had a discussion about her increasing menorrhagia  This occurs with regular cycles  Examination was suspicious for fibroid  Will now make arrangements for an ultrasound  She will continue get yearly mammograms  We also noted her blood pressure was 140/90  She is aware this is a cut off  I have asked her to check with her school does have a pressure checked on a daily for the next few days  If she is consistently over 140/90 she must seek attention from her primary care physician  She tried exercise lose more weight  She will continue get yearly mammograms  Colonoscopies should begin at age 37  She should return my office in 1 year unless new issues occur  Subjective:      Patient ID: Raudel Montesinos is a 35 y o  female  HPI    This is a 22-year-old white female, she is a  4 para 2 with 2 prior  section deliveries     Her current method of contraception includes withdrawal   Her menstrual cycles are regular predictable  She is 1-2 days of very heavy flow  Last year we talked about using Lysteda  She would like the way she felt has not used it since  She continues has 2 heavy days  I have decided to make arrangements to get an ultrasound to look for the presence of fibroids  It was also noted today her blood pressure was 140/90  She was reminded to take her blood pressures daily for the next week  If she continues to trend above 140/90 she see a primary care physician  She is not happy with her weight  She feels safe at home  She is a dentist on a regular basis  Denies any problem depression or anxiety  There are no new major family illnesses report at this time  Mammograms up-to-date  She is aware to start colorectal screening at age 37       The following portions of the patient's history were reviewed and updated as appropriate: allergies, current medications, past family history, past medical history, past social history, past surgical history and problem list     Review of Systems   HENT: Negative for rhinorrhea  Genitourinary: Positive for menstrual problem  All other systems reviewed and are negative  Objective:      /90   Ht 5' 4" (1 626 m)   Wt 105 kg (231 lb 9 6 oz)   LMP 11/02/2022 (Exact Date)   BMI 39 75 kg/m²          Physical Exam  Vitals reviewed  Exam conducted with a chaperone present  Constitutional:       Appearance: Normal appearance  HENT:      Head: Normocephalic and atraumatic  Mouth/Throat:      Mouth: Mucous membranes are moist    Eyes:      Extraocular Movements: Extraocular movements intact  Pupils: Pupils are equal, round, and reactive to light  Cardiovascular:      Rate and Rhythm: Normal rate and regular rhythm  Pulses: Normal pulses  Heart sounds: Normal heart sounds  Pulmonary:      Effort: Pulmonary effort is normal       Breath sounds: Normal breath sounds  Chest:   Breasts: Breasts are symmetrical       Right: Normal  No swelling, bleeding, inverted nipple, mass, nipple discharge, skin change, tenderness or supraclavicular adenopathy  Left: Normal  No swelling, bleeding, inverted nipple, mass, nipple discharge, skin change, tenderness or supraclavicular adenopathy  Abdominal:      General: Abdomen is flat  Bowel sounds are normal  There is no distension  Palpations: Abdomen is soft  There is no hepatomegaly, splenomegaly or mass  Tenderness: There is no abdominal tenderness  There is no right CVA tenderness, left CVA tenderness, guarding or rebound  Hernia: No hernia is present  There is no hernia in the umbilical area, left inguinal area or right inguinal area  Genitourinary:     General: Normal vulva  Pubic Area: No rash or pubic lice  Labia:         Right: No rash or tenderness  Left: No rash or tenderness  Vagina: Normal  No signs of injury and foreign body  No vaginal discharge, erythema, tenderness, bleeding, lesions or prolapsed vaginal walls  Cervix: Normal       Uterus: Enlarged  Adnexa: Right adnexa normal and left adnexa normal         Right: No mass, tenderness or fullness  Left: No mass, tenderness or fullness  Rectum: Normal       Comments: The external genitalia normal limits the vagina is clean  Cervix is parous  Uterus is retroverted top-normal size probable fibroid in the fundus  The adnexa clear bilaterally  There is no cervical motion tenderness  There is no evidence of prolapse  A Pap smear was performed  Musculoskeletal:         General: Normal range of motion  Cervical back: Normal range of motion and neck supple  Lymphadenopathy:      Upper Body:      Right upper body: No supraclavicular adenopathy  Left upper body: No supraclavicular adenopathy  Lower Body: No right inguinal adenopathy  No left inguinal adenopathy  Skin:     General: Skin is warm and dry  Neurological:      General: No focal deficit present  Mental Status: She is alert and oriented to person, place, and time     Psychiatric:         Mood and Affect: Mood normal          Behavior: Behavior normal

## 2022-11-09 NOTE — PATIENT INSTRUCTIONS
The patient was informed of my examination was suspicious of a large uterus possible fibroid  Will make arrangements for ultrasound  She will continue to watch her bleeding pattern

## 2022-11-12 LAB
HPV HR 12 DNA CVX QL NAA+PROBE: NEGATIVE
HPV16 DNA CVX QL NAA+PROBE: NEGATIVE
HPV18 DNA CVX QL NAA+PROBE: NEGATIVE

## 2022-11-15 LAB
LAB AP GYN PRIMARY INTERPRETATION: NORMAL
LAB AP LMP: NORMAL
Lab: NORMAL
PATH INTERP SPEC-IMP: NORMAL

## 2022-12-12 ENCOUNTER — TELEPHONE (OUTPATIENT)
Dept: OBGYN CLINIC | Facility: CLINIC | Age: 42
End: 2022-12-12

## 2022-12-12 DIAGNOSIS — B96.89 BV (BACTERIAL VAGINOSIS): Primary | ICD-10-CM

## 2022-12-12 DIAGNOSIS — N76.0 BV (BACTERIAL VAGINOSIS): Primary | ICD-10-CM

## 2022-12-12 RX ORDER — METRONIDAZOLE 500 MG/1
TABLET ORAL
Qty: 14 TABLET | Refills: 0 | Status: SHIPPED | OUTPATIENT
Start: 2022-12-12 | End: 2022-12-18

## 2023-07-17 ENCOUNTER — OFFICE VISIT (OUTPATIENT)
Dept: OBGYN CLINIC | Facility: CLINIC | Age: 43
End: 2023-07-17
Payer: COMMERCIAL

## 2023-07-17 VITALS
DIASTOLIC BLOOD PRESSURE: 84 MMHG | BODY MASS INDEX: 39.61 KG/M2 | HEIGHT: 64 IN | SYSTOLIC BLOOD PRESSURE: 120 MMHG | WEIGHT: 232 LBS

## 2023-07-17 DIAGNOSIS — N89.8 VAGINAL DISCHARGE: Primary | ICD-10-CM

## 2023-07-17 PROCEDURE — 99213 OFFICE O/P EST LOW 20 MIN: CPT | Performed by: OBSTETRICS & GYNECOLOGY

## 2023-07-17 NOTE — PROGRESS NOTES
This is a 40-year-old white female, she is a  4 para 2 with 2 prior  sections. Her method of contraception includes condoms and withdrawal.  She is now complaining of over a month duration of itching and burning. She is not sure what is going on. Denies any fever or chills. She would like to have a culture done. Inspection of the external genitalia normal limits. The vagina shows evidence of end of her menstrual cycle. There is no obvious discharge noted this time. A culture was done of the vagina and cervix for STD screening and vaginal discharge. We will hold any therapy she received results of her cultures. She was reassured.

## 2023-07-17 NOTE — PATIENT INSTRUCTIONS
We discussed the etiology of prolonged vaginal discharge including yeast infection and bacterial vaginosis. A culture was done. We will await the results of culture before any therapy was started. If things worsen she will contact me.

## 2023-07-18 ENCOUNTER — OFFICE VISIT (OUTPATIENT)
Dept: URGENT CARE | Facility: MEDICAL CENTER | Age: 43
End: 2023-07-18
Payer: COMMERCIAL

## 2023-07-18 VITALS
WEIGHT: 200 LBS | DIASTOLIC BLOOD PRESSURE: 83 MMHG | SYSTOLIC BLOOD PRESSURE: 125 MMHG | RESPIRATION RATE: 18 BRPM | TEMPERATURE: 98.3 F | BODY MASS INDEX: 34.15 KG/M2 | HEART RATE: 82 BPM | OXYGEN SATURATION: 98 % | HEIGHT: 64 IN

## 2023-07-18 DIAGNOSIS — H00.012 HORDEOLUM EXTERNUM OF RIGHT LOWER EYELID: Primary | ICD-10-CM

## 2023-07-18 LAB
BV BACTERIA RRNA VAG QL NAA+PROBE: NEGATIVE
C GLABRATA RNA VAG QL NAA+PROBE: NOT DETECTED
CANDIDA RRNA VAG QL PROBE: NOT DETECTED
T VAGINALIS RRNA SPEC QL NAA+PROBE: NOT DETECTED

## 2023-07-18 PROCEDURE — 99213 OFFICE O/P EST LOW 20 MIN: CPT

## 2023-07-18 RX ORDER — ERYTHROMYCIN 5 MG/G
0.5 OINTMENT OPHTHALMIC EVERY 6 HOURS SCHEDULED
Qty: 28 G | Refills: 0 | Status: SHIPPED | OUTPATIENT
Start: 2023-07-18 | End: 2023-07-25

## 2023-07-18 NOTE — PATIENT INSTRUCTIONS
Use antibiotic ointment as prescribed   Start over the counter anti-histamine such as Claritin, Zyrtec, or Allegra  Apply warm compresses  Avoid touching eyes  Wash hands frequently  Change pillowcases daily  Follow up with PCP in 3-5 days. Proceed to ER if symptoms worsen.

## 2023-07-18 NOTE — PROGRESS NOTES
[facility]        NAME: Ari Maxwell is a 38 y.o. female  : 1980    MRN: [id number]  DATE: 2023  TIME: 12:41 PM    Assessment and Plan   Hordeolum externum of right lower eyelid [H00.012]  1. Hordeolum externum of right lower eyelid  erythromycin (ILOTYCIN) ophthalmic ointment            Patient Instructions     Use antibiotic ointment as prescribed   Start over the counter anti-histamine such as Claritin, Zyrtec, or Allegra  Apply warm compresses  Avoid touching eyes  Wash hands frequently  Change pillowcases daily  Follow up with PCP in 3-5 days. Proceed to  ER if symptoms worsen. Chief Complaint     Chief Complaint   Patient presents with   • Eye Problem     Pt having right eye swelling x3 days, pt feels a bump on her eye. History of Present Illness       Patient is a 39 yo female with no significant PMH presenting in the clinic today for right eyelid swelling x 3 days. Patient notes right lower eyelid swelling and tenderness. Admits pruritus of the periorbital region. Denies fever, chills, headache, dizziness, visual changes, photophobia, eye redness, eye discharge, and eye pain. Admits the use of warm compresses for symptom management. Denies recent sick contacts. Denies the use of corrective contact lenses. Review of Systems   Review of Systems   Constitutional: Negative for chills and fever. Eyes: Negative for photophobia, pain, discharge, redness, itching and visual disturbance. Respiratory: Negative for shortness of breath. Cardiovascular: Negative for chest pain. Skin: Negative for rash and wound. Neurological: Negative for dizziness and light-headedness.          Current Medications       Current Outpatient Medications:   •  erythromycin (ILOTYCIN) ophthalmic ointment, Administer 0.5 inches to the right eye every 6 (six) hours for 7 days, Disp: 28 g, Rfl: 0  •  amoxicillin-clavulanate (AUGMENTIN) 875-125 mg per tablet, TAKE 1 TABLET BY MOUTH EVERY DAY TWICE A DAY, Disp: , Rfl: 0  •  Tranexamic Acid 650 MG TABS, Please take 2 tablets 3 times a day yet and on 1st day of menstrual cycle for total of 5 days. (Patient not taking: Reported on 7/30/2022), Disp: 30 tablet, Rfl: 3    Current Allergies     Allergies as of 07/18/2023   • (No Known Allergies)            The following portions of the patient's history were reviewed and updated as appropriate: allergies, current medications, past family history, past medical history, past social history, past surgical history and problem list.     Past Medical History:   Diagnosis Date   • Abnormal ECG        History reviewed. No pertinent surgical history. Family History   Problem Relation Age of Onset   • Hyperlipidemia Mother    • Lymphoma Father 58        agent orange   • No Known Problems Daughter    • No Known Problems Maternal Grandmother    • No Known Problems Maternal Grandfather    • Cancer Paternal Grandmother    • No Known Problems Paternal Grandfather    • No Known Problems Brother    • No Known Problems Son    • Alcohol abuse Other    • Breast cancer Neg Hx          Medications have been verified. Objective   /83   Pulse 82   Temp 98.3 °F (36.8 °C) (Temporal)   Resp 18   Ht 5' 4" (1.626 m)   Wt 90.7 kg (200 lb)   LMP 07/13/2023 (Exact Date)   SpO2 98%   BMI 34.33 kg/m²        Physical Exam     Physical Exam  Vitals reviewed. Constitutional:       General: She is not in acute distress. Appearance: Normal appearance. She is normal weight. She is not ill-appearing. HENT:      Head: Normocephalic. Nose: Nose normal.      Mouth/Throat:      Mouth: Mucous membranes are moist.   Eyes:      General: Lids are everted, no foreign bodies appreciated. Vision grossly intact. No allergic shiner. Right eye: Hordeolum (externum along medial aspect of lower eyelid) present. No discharge. Left eye: No discharge or hordeolum.       Extraocular Movements: Extraocular movements intact. Right eye: No nystagmus. Left eye: No nystagmus. Conjunctiva/sclera: Conjunctivae normal.      Right eye: Right conjunctiva is not injected. No chemosis. Left eye: Left conjunctiva is not injected. No chemosis. Pupils: Pupils are equal, round, and reactive to light. Cardiovascular:      Rate and Rhythm: Normal rate and regular rhythm. Pulses: Normal pulses. Heart sounds: Normal heart sounds. No murmur heard. No friction rub. No gallop. Pulmonary:      Effort: Pulmonary effort is normal.      Breath sounds: Normal breath sounds. No rhonchi or rales. Musculoskeletal:      Cervical back: Normal range of motion and neck supple. Skin:     General: Skin is warm. Findings: No rash. Neurological:      Mental Status: She is alert.    Psychiatric:         Mood and Affect: Mood normal.         Behavior: Behavior normal.

## 2023-11-14 ENCOUNTER — ANNUAL EXAM (OUTPATIENT)
Dept: OBGYN CLINIC | Facility: CLINIC | Age: 43
End: 2023-11-14
Payer: COMMERCIAL

## 2023-11-14 VITALS
HEIGHT: 64 IN | DIASTOLIC BLOOD PRESSURE: 78 MMHG | BODY MASS INDEX: 36.7 KG/M2 | SYSTOLIC BLOOD PRESSURE: 124 MMHG | WEIGHT: 215 LBS

## 2023-11-14 DIAGNOSIS — Z12.31 ENCOUNTER FOR SCREENING MAMMOGRAM FOR BREAST CANCER: ICD-10-CM

## 2023-11-14 DIAGNOSIS — Z01.419 WOMEN'S ANNUAL ROUTINE GYNECOLOGICAL EXAMINATION: Primary | ICD-10-CM

## 2023-11-14 PROCEDURE — S0612 ANNUAL GYNECOLOGICAL EXAMINA: HCPCS | Performed by: OBSTETRICS & GYNECOLOGY

## 2023-11-14 NOTE — PROGRESS NOTES
Assessment/Plan:    The patient was informed of a stable GYN examination. Her menstrual cycles are more regular and not as heavy and interfering with her lifestyle. The uterus felt to be normal.  The adnexa are otherwise unremarkable. She will continue to work on her weight. 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 She was reminded of colorectal screening beginning at age 37. She should return to my office in 1 year unless new issues occur. Subjective:      Patient ID: Елена Knight is a 38 y.o. female. HPI    This is a 58-year-old white female, she is a  4 para 2 with 2 prior vaginal deliveries. Her current method of contraception includes withdrawal.  Her menstrual cycles are regular and predictable. There is not as heavy as before. She is no longer using Lysteda. There is no problem with intimacy. She is content with her weight. She feels safe at home. She is a dentist on a regular basis. She denies any problem with depression or anxiety. She feels safe at home. She is a . She is on a good school year. She will need to make arrangements for mammogram.  Last year we talked about evidence of slight uterine enlargement and ordered an ultrasound that was not done. We may consider doing again after the tumor exam.  There are no new major family illnesses or deaths. The following portions of the patient's history were reviewed and updated as appropriate: allergies, current medications, past family history, past medical history, past social history, past surgical history, and problem list.    Review of Systems   All other systems reviewed and are negative. Objective:      /78   Ht 5' 4" (1.626 m)   Wt 97.5 kg (215 lb)   LMP 2023 (Approximate)   BMI 36.90 kg/m²          Physical Exam  Vitals reviewed. Exam conducted with a chaperone present. Constitutional:       Appearance: Normal appearance. HENT:      Head: Normocephalic. Eyes:      Extraocular Movements: Extraocular movements intact. Pupils: Pupils are equal, round, and reactive to light. Cardiovascular:      Rate and Rhythm: Normal rate and regular rhythm. Pulmonary:      Effort: Pulmonary effort is normal.      Breath sounds: Normal breath sounds. Chest:   Breasts:     Breasts are symmetrical.      Right: Normal.      Left: Normal.   Abdominal:      General: Abdomen is flat. Bowel sounds are normal.      Palpations: Abdomen is soft. There is no hepatomegaly or splenomegaly. Hernia: No hernia is present. There is no hernia in the left inguinal area or right inguinal area. Genitourinary:     General: Normal vulva. Pubic Area: No rash or pubic lice. Labia:         Right: No rash, tenderness, lesion or injury. Left: No rash, tenderness, lesion or injury. Urethra: No prolapse, urethral pain, urethral swelling or urethral lesion. Vagina: Normal.      Cervix: Normal.      Uterus: Normal.       Adnexa: Right adnexa normal and left adnexa normal.      Rectum: Normal.      Comments: The external genitalia normal limits the vagina is clean the uterus is anterior normal size there is no cervical motion tenderness. Pap smear was not performed. The adnexa is clear bilaterally but there is no evidence of prolapse. Urethra and bladder normal working relationship. Musculoskeletal:      Cervical back: Normal range of motion and neck supple. Lymphadenopathy:      Upper Body:      Right upper body: No supraclavicular adenopathy. Left upper body: No supraclavicular adenopathy. Skin:     General: Skin is warm and dry. Neurological:      General: No focal deficit present. Mental Status: She is alert and oriented to person, place, and time.    Psychiatric:         Mood and Affect: Mood normal.         Behavior: Behavior normal.

## 2023-11-14 NOTE — PATIENT INSTRUCTIONS
The patient was informed of a stable GYN examination. There is no signs of menopause. Her cycles are more regular and less slow minded for the lifestyle. There is no problem with intimacy. She feels safe at home. She is a dentist on a regular basis. Denies any prior depression or anxiety. She was reminded to make arrangements for colorectal screening after her 43th birthday she will contact her primary care physician. She will continue getting yearly mammograms she will return to my office in 1 year unless new issues occur.

## 2024-02-01 ENCOUNTER — HOSPITAL ENCOUNTER (OUTPATIENT)
Dept: RADIOLOGY | Facility: MEDICAL CENTER | Age: 44
Discharge: HOME | End: 2024-02-01
Payer: COMMERCIAL

## 2024-02-01 VITALS — BODY MASS INDEX: 36.7 KG/M2 | HEIGHT: 64 IN | WEIGHT: 215 LBS

## 2024-02-01 DIAGNOSIS — Z12.31 ENCOUNTER FOR SCREENING MAMMOGRAM FOR BREAST CANCER: ICD-10-CM

## 2024-02-01 PROCEDURE — 77063 BREAST TOMOSYNTHESIS BI: CPT

## 2024-02-01 PROCEDURE — 77067 SCR MAMMO BI INCL CAD: CPT

## 2024-04-16 ENCOUNTER — APPOINTMENT (OUTPATIENT)
Dept: LAB | Facility: CLINIC | Age: 44
End: 2024-04-16
Payer: COMMERCIAL

## 2024-04-16 DIAGNOSIS — R63.4 LOSS OF WEIGHT: ICD-10-CM

## 2024-04-16 DIAGNOSIS — D50.9 IRON DEFICIENCY ANEMIA, UNSPECIFIED IRON DEFICIENCY ANEMIA TYPE: ICD-10-CM

## 2024-04-16 DIAGNOSIS — E78.5 HYPERLIPIDEMIA, UNSPECIFIED HYPERLIPIDEMIA TYPE: ICD-10-CM

## 2024-04-16 LAB
ALBUMIN SERPL BCP-MCNC: 4.2 G/DL (ref 3.5–5)
ALP SERPL-CCNC: 50 U/L (ref 34–104)
ALT SERPL W P-5'-P-CCNC: 15 U/L (ref 7–52)
ANION GAP SERPL CALCULATED.3IONS-SCNC: 6 MMOL/L (ref 4–13)
AST SERPL W P-5'-P-CCNC: 15 U/L (ref 13–39)
BASOPHILS # BLD AUTO: 0.03 THOUSANDS/ÂΜL (ref 0–0.1)
BASOPHILS NFR BLD AUTO: 1 % (ref 0–1)
BILIRUB SERPL-MCNC: 0.42 MG/DL (ref 0.2–1)
BUN SERPL-MCNC: 14 MG/DL (ref 5–25)
CALCIUM SERPL-MCNC: 9.3 MG/DL (ref 8.4–10.2)
CHLORIDE SERPL-SCNC: 103 MMOL/L (ref 96–108)
CHOLEST SERPL-MCNC: 204 MG/DL
CO2 SERPL-SCNC: 27 MMOL/L (ref 21–32)
CREAT SERPL-MCNC: 0.77 MG/DL (ref 0.6–1.3)
EOSINOPHIL # BLD AUTO: 0.27 THOUSAND/ÂΜL (ref 0–0.61)
EOSINOPHIL NFR BLD AUTO: 4 % (ref 0–6)
ERYTHROCYTE [DISTWIDTH] IN BLOOD BY AUTOMATED COUNT: 12.5 % (ref 11.6–15.1)
GFR SERPL CREATININE-BSD FRML MDRD: 93 ML/MIN/1.73SQ M
GLUCOSE P FAST SERPL-MCNC: 88 MG/DL (ref 65–99)
HCT VFR BLD AUTO: 41.2 % (ref 34.8–46.1)
HDLC SERPL-MCNC: 81 MG/DL
HGB BLD-MCNC: 13.4 G/DL (ref 11.5–15.4)
IMM GRANULOCYTES # BLD AUTO: 0.02 THOUSAND/UL (ref 0–0.2)
IMM GRANULOCYTES NFR BLD AUTO: 0 % (ref 0–2)
LDLC SERPL CALC-MCNC: 110 MG/DL (ref 0–100)
LYMPHOCYTES # BLD AUTO: 1.67 THOUSANDS/ÂΜL (ref 0.6–4.47)
LYMPHOCYTES NFR BLD AUTO: 25 % (ref 14–44)
MCH RBC QN AUTO: 29.9 PG (ref 26.8–34.3)
MCHC RBC AUTO-ENTMCNC: 32.5 G/DL (ref 31.4–37.4)
MCV RBC AUTO: 92 FL (ref 82–98)
MONOCYTES # BLD AUTO: 0.5 THOUSAND/ÂΜL (ref 0.17–1.22)
MONOCYTES NFR BLD AUTO: 8 % (ref 4–12)
NEUTROPHILS # BLD AUTO: 4.09 THOUSANDS/ÂΜL (ref 1.85–7.62)
NEUTS SEG NFR BLD AUTO: 62 % (ref 43–75)
NONHDLC SERPL-MCNC: 123 MG/DL
NRBC BLD AUTO-RTO: 0 /100 WBCS
PLATELET # BLD AUTO: 316 THOUSANDS/UL (ref 149–390)
PMV BLD AUTO: 9.3 FL (ref 8.9–12.7)
POTASSIUM SERPL-SCNC: 4.2 MMOL/L (ref 3.5–5.3)
PROT SERPL-MCNC: 6.6 G/DL (ref 6.4–8.4)
RBC # BLD AUTO: 4.48 MILLION/UL (ref 3.81–5.12)
SODIUM SERPL-SCNC: 136 MMOL/L (ref 135–147)
T4 FREE SERPL-MCNC: 0.51 NG/DL (ref 0.61–1.12)
TRIGL SERPL-MCNC: 63 MG/DL
TSH SERPL DL<=0.05 MIU/L-ACNC: 6.37 UIU/ML (ref 0.45–4.5)
WBC # BLD AUTO: 6.58 THOUSAND/UL (ref 4.31–10.16)

## 2024-04-16 PROCEDURE — 84439 ASSAY OF FREE THYROXINE: CPT

## 2024-04-16 PROCEDURE — 36415 COLL VENOUS BLD VENIPUNCTURE: CPT

## 2024-04-16 PROCEDURE — 80053 COMPREHEN METABOLIC PANEL: CPT

## 2024-04-16 PROCEDURE — 84443 ASSAY THYROID STIM HORMONE: CPT

## 2024-04-16 PROCEDURE — 85025 COMPLETE CBC W/AUTO DIFF WBC: CPT

## 2024-04-16 PROCEDURE — 80061 LIPID PANEL: CPT

## 2024-06-11 NOTE — TELEPHONE ENCOUNTER
2nd attempt  The patient was called for notification of a test result for COVID-19  The patient did not answer the phone and a voicemail was left requesting a call back to [phone], Option 7  Spoke to pt mom and scheduled pt to come in tomorrow at 10:40 for a uti.

## 2024-10-29 ENCOUNTER — TRANSCRIBE ORDERS (OUTPATIENT)
Dept: LAB | Facility: CLINIC | Age: 44
End: 2024-10-29

## 2024-10-29 ENCOUNTER — APPOINTMENT (OUTPATIENT)
Dept: LAB | Facility: CLINIC | Age: 44
End: 2024-10-29
Payer: COMMERCIAL

## 2024-10-29 DIAGNOSIS — I51.9 MYXEDEMA HEART DISEASE: Primary | ICD-10-CM

## 2024-10-29 DIAGNOSIS — E03.9 MYXEDEMA HEART DISEASE: ICD-10-CM

## 2024-10-29 DIAGNOSIS — I51.9 MYXEDEMA HEART DISEASE: ICD-10-CM

## 2024-10-29 DIAGNOSIS — E03.9 MYXEDEMA HEART DISEASE: Primary | ICD-10-CM

## 2024-10-29 LAB
T4 FREE SERPL-MCNC: 0.65 NG/DL (ref 0.61–1.12)
TSH SERPL DL<=0.05 MIU/L-ACNC: 5.15 UIU/ML (ref 0.45–4.5)

## 2024-10-29 PROCEDURE — 84443 ASSAY THYROID STIM HORMONE: CPT

## 2024-10-29 PROCEDURE — 36415 COLL VENOUS BLD VENIPUNCTURE: CPT

## 2024-10-29 PROCEDURE — 84439 ASSAY OF FREE THYROXINE: CPT

## 2024-11-26 ENCOUNTER — OFFICE VISIT (OUTPATIENT)
Dept: URGENT CARE | Facility: MEDICAL CENTER | Age: 44
End: 2024-11-26
Payer: COMMERCIAL

## 2024-11-26 VITALS
RESPIRATION RATE: 18 BRPM | OXYGEN SATURATION: 98 % | TEMPERATURE: 97.9 F | DIASTOLIC BLOOD PRESSURE: 90 MMHG | SYSTOLIC BLOOD PRESSURE: 132 MMHG | HEART RATE: 94 BPM

## 2024-11-26 DIAGNOSIS — J02.9 SORE THROAT: Primary | ICD-10-CM

## 2024-11-26 LAB — S PYO AG THROAT QL: NEGATIVE

## 2024-11-26 PROCEDURE — G0382 LEV 3 HOSP TYPE B ED VISIT: HCPCS

## 2024-11-26 PROCEDURE — S9083 URGENT CARE CENTER GLOBAL: HCPCS

## 2024-11-26 PROCEDURE — 87880 STREP A ASSAY W/OPTIC: CPT

## 2024-11-26 PROCEDURE — 87070 CULTURE OTHR SPECIMN AEROBIC: CPT

## 2024-11-26 RX ORDER — LEVOTHYROXINE SODIUM 25 UG/1
TABLET ORAL
COMMUNITY
Start: 2024-09-06

## 2024-11-26 RX ORDER — PREDNISONE 20 MG/1
TABLET ORAL
COMMUNITY
Start: 2024-10-09

## 2024-11-26 RX ORDER — AMOXICILLIN 500 MG/1
500 CAPSULE ORAL EVERY 8 HOURS
COMMUNITY
Start: 2024-10-09

## 2024-11-26 RX ORDER — LEVOTHYROXINE SODIUM 50 UG/1
TABLET ORAL
COMMUNITY
Start: 2024-11-20

## 2024-11-26 RX ORDER — FLUTICASONE PROPIONATE 50 MCG
SPRAY, SUSPENSION (ML) NASAL
COMMUNITY
Start: 2024-10-09

## 2024-11-26 RX ORDER — LEVOTHYROXINE SODIUM 25 UG/1
TABLET ORAL
COMMUNITY
Start: 2024-11-09

## 2024-11-26 NOTE — PROGRESS NOTES
[facility]        NAME: Corinne K Hinkel is a 44 y.o. female  : 1980    MRN: [id number]  DATE: 2024  TIME: 5:00 PM    Assessment and Plan   Sore throat [J02.9]  1. Sore throat  POCT rapid ANTIGEN strepA        Rapid strep negative, pending throat culture results.     Patient Instructions     Please trial warm salt water gargles, Chloraseptic spray, Cepacol cough drops and warm tea with honey as needed for sore throat.  Please  alternate ibuprofen and Tylenol as needed for fever and pain.  Follow up with PCP in 3-5 days.  Proceed to  ER if symptoms worsen.    If tests are performed, our office will contact you with results only if changes need to made to the care plan discussed with you at the visit. You can review your full results on [facility].    Chief Complaint     Chief Complaint   Patient presents with    Sore Throat     Sore throat, post nasal drip, cough since yesterday. No fever.          History of Present Illness       44-year-old female presenting for evaluation of sore throat.  Over the past day patient has experienced worsening sore throat associated with mild cough and congestion.  She has been  using cough drops with mild relief of her symptoms.  She denies any fevers, chills, chest pain or shortness of breath.    Sore Throat   Associated symptoms include congestion and coughing. Pertinent negatives include no diarrhea, shortness of breath or vomiting.       Review of Systems   Review of Systems   Constitutional:  Negative for chills and fever.   HENT:  Positive for congestion and sore throat.    Respiratory:  Positive for cough. Negative for shortness of breath.    Cardiovascular:  Negative for chest pain.   Gastrointestinal:  Negative for diarrhea, nausea and vomiting.   All other systems reviewed and are negative.        Current Medications       Current Outpatient Medications:     amoxicillin-clavulanate (AUGMENTIN) 875-125 mg per tablet, TAKE 1 TABLET BY  MOUTH EVERY DAY TWICE A DAY, Disp: , Rfl: 0    fluticasone (FLONASE) 50 mcg/act nasal spray, spray 2 sprays into each nostril every day, Disp: , Rfl:     levothyroxine 50 mcg tablet, , Disp: , Rfl:     amoxicillin (AMOXIL) 500 mg capsule, Take 500 mg by mouth every 8 (eight) hours (Patient not taking: Reported on 11/26/2024), Disp: , Rfl:     levothyroxine 25 mcg tablet, TAKE 1 TABLET BY MOUTH EVERY DAY ON EMPTY STOMACH 30 MINUTES BEFORE BREAKFAST (Patient not taking: Reported on 11/26/2024), Disp: , Rfl:     levothyroxine 25 mcg tablet, TAKE 1 TABLET BY MOUTH EVERY DAY ON EMPTY STOMACH 30 MINUTES BEFORE BREAKFAST (Patient not taking: Reported on 11/26/2024), Disp: , Rfl:     predniSONE 20 mg tablet, TAKE 2 TABLETS (40 MG) TODAY AND TOMORROW, THEN TAKE 1 TABLET (20 MG) ONCE DAILY FOR 2 DAYS (Patient not taking: Reported on 11/26/2024), Disp: , Rfl:     Tranexamic Acid 650 MG TABS, Please take 2 tablets 3 times a day yet and on 1st day of menstrual cycle for total of 5 days. (Patient not taking: Reported on 11/26/2024), Disp: 30 tablet, Rfl: 3    Current Allergies     Allergies as of 11/26/2024    (No Known Allergies)            The following portions of the patient's history were reviewed and updated as appropriate: allergies, current medications, past family history, past medical history, past social history, past surgical history and problem list.     Past Medical History:   Diagnosis Date    Abnormal ECG        History reviewed. No pertinent surgical history.    Family History   Problem Relation Age of Onset    Hyperlipidemia Mother     Lymphoma Father 62        agent orange    No Known Problems Daughter     No Known Problems Maternal Grandmother     No Known Problems Maternal Grandfather     Cancer Paternal Grandmother     No Known Problems Paternal Grandfather     No Known Problems Brother     No Known Problems Son     No Known Problems Paternal Aunt     Breast cancer Neg Hx          Medications have been  verified.        Objective   /86   Pulse 94   Temp 97.9 °F (36.6 °C)   Resp 18   SpO2 98%        Physical Exam     Physical Exam  Vitals and nursing note reviewed.   Constitutional:       General: She is not in acute distress.     Appearance: Normal appearance. She is normal weight. She is not ill-appearing, toxic-appearing or diaphoretic.   HENT:      Head: Normocephalic and atraumatic.      Right Ear: Tympanic membrane normal.      Left Ear: Tympanic membrane normal.      Nose: Congestion present. No rhinorrhea.      Mouth/Throat:      Mouth: Mucous membranes are moist.      Pharynx: Oropharynx is clear. Posterior oropharyngeal erythema present. No oropharyngeal exudate.   Eyes:      General:         Right eye: No discharge.         Left eye: No discharge.   Cardiovascular:      Rate and Rhythm: Normal rate and regular rhythm.      Pulses: Normal pulses.      Heart sounds: Normal heart sounds. No murmur heard.     No friction rub. No gallop.   Pulmonary:      Effort: Pulmonary effort is normal. No respiratory distress.      Breath sounds: Normal breath sounds. No stridor. No wheezing, rhonchi or rales.   Chest:      Chest wall: No tenderness.   Abdominal:      General: Bowel sounds are normal.      Palpations: Abdomen is soft.      Tenderness: There is no abdominal tenderness.   Skin:     General: Skin is warm and dry.   Neurological:      Mental Status: She is alert.   Psychiatric:         Mood and Affect: Mood normal.         Behavior: Behavior normal.

## 2024-11-28 LAB — BACTERIA THROAT CULT: NORMAL

## 2024-11-29 ENCOUNTER — RESULTS FOLLOW-UP (OUTPATIENT)
Dept: URGENT CARE | Facility: MEDICAL CENTER | Age: 44
End: 2024-11-29

## 2024-12-30 ENCOUNTER — ANNUAL EXAM (OUTPATIENT)
Dept: OBGYN CLINIC | Facility: CLINIC | Age: 44
End: 2024-12-30
Payer: COMMERCIAL

## 2024-12-30 VITALS — DIASTOLIC BLOOD PRESSURE: 74 MMHG | WEIGHT: 260 LBS | BODY MASS INDEX: 44.63 KG/M2 | SYSTOLIC BLOOD PRESSURE: 122 MMHG

## 2024-12-30 DIAGNOSIS — Z12.31 ENCOUNTER FOR SCREENING MAMMOGRAM FOR BREAST CANCER: Primary | ICD-10-CM

## 2024-12-30 DIAGNOSIS — Z01.419 WOMEN'S ANNUAL ROUTINE GYNECOLOGICAL EXAMINATION: ICD-10-CM

## 2024-12-30 PROCEDURE — S0612 ANNUAL GYNECOLOGICAL EXAMINA: HCPCS | Performed by: OBSTETRICS & GYNECOLOGY

## 2024-12-30 NOTE — PROGRESS NOTES
Name: Corinne K Hinkel      : 1980      MRN: [id number]  Encounter Provider: Jak Welch MD  Encounter Date: 2024   Encounter department: [facility]  :  Assessment & Plan  Women's annual routine gynecological examination  The patient was informed of a stable GYN examination.  A Pap smear was not performed.  We had a discussion about her weight change.  I think she could benefit from professional weight loss counseling.  I strongly suggest she consider using [facility] medical weight loss program.  She feels safe at home.  There is no problem intimacy.  There is no major  or GI complaint.  Mammograms up-to-date.  She was reminded of the importance of colorectal screening to try to get that done in .  She was monitored to look for signs and symptoms of menopause.       Encounter for screening mammogram for breast cancer    Orders:    Mammo screening bilateral w 3d and cad; Future        History of Present Illness   HPI  Corinne K Hinkel is a 44 y.o. female who presents for her annual GYN examination.  She is a  4 para 2 with 2 prior vaginal deliveries.  Her current method of contraception includes withdrawal condoms.  There is no problem with intimacy.  She feels safe at home.  She sees a dentist on a regular basis.  She not happy with her weight.  Denies any prior depression or anxiety.  Her PHQ score today is total of 2 with a 1 for little interest or pleasure doing things and 1 for feeling tired or having little energy.  She denies any problem with depression.  She is gainfully employed as a .  Family history reviewed everything is negative.  She does have a history of hypothyroidism.  She is on medication.  She does not need a Pap smear this year.      Review of Systems   All other systems reviewed and are negative.         Objective   /74   Wt 112 kg (247 lb)   LMP 2024 (Approximate)   BMI 42.40 kg/m²      Physical Exam  Vitals reviewed.  Exam conducted with a chaperone present.   Constitutional:       Appearance: She is obese.   HENT:      Head: Normocephalic and atraumatic.      Nose: Nose normal.      Mouth/Throat:      Mouth: Mucous membranes are moist.   Eyes:      Extraocular Movements: Extraocular movements intact.      Pupils: Pupils are equal, round, and reactive to light.   Cardiovascular:      Rate and Rhythm: Normal rate and regular rhythm.      Pulses: Normal pulses.   Pulmonary:      Effort: Pulmonary effort is normal.      Breath sounds: Normal breath sounds.   Chest:   Breasts:     Breasts are symmetrical.      Right: Normal.      Left: Normal.   Abdominal:      General: Abdomen is flat. Bowel sounds are normal. There is no distension.      Palpations: Abdomen is soft. There is no hepatomegaly, splenomegaly or mass.      Tenderness: There is no abdominal tenderness.      Hernia: No hernia is present. There is no hernia in the umbilical area, ventral area, left inguinal area or right inguinal area.   Genitourinary:     General: Normal vulva.      Pubic Area: No rash or pubic lice.       Labia:         Right: No rash, tenderness, lesion or injury.         Left: No rash, tenderness, lesion or injury.       Urethra: No prolapse, urethral pain, urethral swelling or urethral lesion.      Vagina: Normal. No signs of injury and foreign body. No vaginal discharge or erythema.      Cervix: Normal.      Uterus: Normal.       Adnexa: Right adnexa normal and left adnexa normal.        Right: No mass, tenderness or fullness.          Left: No mass, tenderness or fullness.        Rectum: Normal.      Comments: The external genitalia within normal limits the vagina is clean the cervix parous uterus is anterior normal size there is no cervical motion tenderness.  There is no evidence of prolapse.  A Pap smear was not performed.  The urethra and bladder normal working relationship.  Musculoskeletal:         General: Normal range of motion.       Cervical back: Normal range of motion and neck supple.   Lymphadenopathy:      Upper Body:      Right upper body: No supraclavicular or pectoral adenopathy.      Left upper body: No supraclavicular or axillary adenopathy.      Lower Body: No right inguinal adenopathy. No left inguinal adenopathy.   Skin:     General: Skin is warm and dry.   Neurological:      General: No focal deficit present.      Mental Status: She is alert and oriented to person, place, and time.   Psychiatric:         Mood and Affect: Mood normal.

## 2024-12-30 NOTE — PATIENT INSTRUCTIONS
The patient was informed of a stable GYN examination.  Pap smear was not performed.  She was also reminded to make arrangements for [place] rectal screening soon as possible.  We discussion about weight loss.  Think she could benefit from the medical weight loss program in our network.  We talked about increasing water intake walking and exercise and not leaning and at night and could not sleep.  Family history reviewed.  She is a history of hypothyroidism she will continue getting on thyroid medicine.  She should return to my office in 1 year unless new issues or problems occur.  I did recommend also hemoglobin A1c with her next routine lab work.

## 2025-03-08 ENCOUNTER — APPOINTMENT (OUTPATIENT)
Dept: LAB | Facility: MEDICAL CENTER | Age: 45
End: 2025-03-08
Payer: COMMERCIAL

## 2025-03-08 DIAGNOSIS — E03.9 HYPOTHYROIDISM, UNSPECIFIED TYPE: ICD-10-CM

## 2025-03-08 LAB
T4 FREE SERPL-MCNC: 0.71 NG/DL (ref 0.61–1.12)
TSH SERPL DL<=0.05 MIU/L-ACNC: 5.42 UIU/ML (ref 0.45–4.5)

## 2025-03-08 PROCEDURE — 84439 ASSAY OF FREE THYROXINE: CPT

## 2025-03-08 PROCEDURE — 84443 ASSAY THYROID STIM HORMONE: CPT

## 2025-03-08 PROCEDURE — 36415 COLL VENOUS BLD VENIPUNCTURE: CPT

## 2025-03-18 ENCOUNTER — HOSPITAL ENCOUNTER (OUTPATIENT)
Dept: RADIOLOGY | Facility: MEDICAL CENTER | Age: 45
Discharge: HOME | End: 2025-03-18
Payer: COMMERCIAL

## 2025-03-18 VITALS — BODY MASS INDEX: 44.39 KG/M2 | HEIGHT: 64 IN | WEIGHT: 260 LBS

## 2025-03-18 DIAGNOSIS — Z12.31 ENCOUNTER FOR SCREENING MAMMOGRAM FOR BREAST CANCER: ICD-10-CM

## 2025-03-18 PROCEDURE — 77067 SCR MAMMO BI INCL CAD: CPT

## 2025-03-18 PROCEDURE — 77063 BREAST TOMOSYNTHESIS BI: CPT

## 2025-05-08 ENCOUNTER — APPOINTMENT (OUTPATIENT)
Dept: RADIOLOGY | Facility: MEDICAL CENTER | Age: 45
End: 2025-05-08
Attending: FAMILY MEDICINE
Payer: COMMERCIAL

## 2025-05-08 ENCOUNTER — APPOINTMENT (OUTPATIENT)
Dept: LAB | Facility: MEDICAL CENTER | Age: 45
End: 2025-05-08
Attending: FAMILY MEDICINE
Payer: COMMERCIAL

## 2025-05-08 DIAGNOSIS — E83.42 HYPOMAGNESEMIA: ICD-10-CM

## 2025-05-08 DIAGNOSIS — D51.9 ANEMIA DUE TO VITAMIN B12 DEFICIENCY, UNSPECIFIED B12 DEFICIENCY TYPE: ICD-10-CM

## 2025-05-08 DIAGNOSIS — E78.2 MIXED HYPERLIPIDEMIA: ICD-10-CM

## 2025-05-08 DIAGNOSIS — N95.9 MENOPAUSAL PROBLEM: ICD-10-CM

## 2025-05-08 DIAGNOSIS — E61.1 IRON DEFICIENCY: ICD-10-CM

## 2025-05-08 DIAGNOSIS — R06.02 SHORTNESS OF BREATH: ICD-10-CM

## 2025-05-08 LAB
FSH SERPL-ACNC: 5 MIU/ML
LH SERPL-ACNC: 15.8 MIU/ML

## 2025-05-08 PROCEDURE — 83002 ASSAY OF GONADOTROPIN (LH): CPT

## 2025-05-08 PROCEDURE — 83001 ASSAY OF GONADOTROPIN (FSH): CPT

## 2025-05-08 PROCEDURE — 71046 X-RAY EXAM CHEST 2 VIEWS: CPT

## 2025-05-23 ENCOUNTER — HOSPITAL ENCOUNTER (OUTPATIENT)
Dept: NON INVASIVE DIAGNOSTICS | Facility: HOSPITAL | Age: 45
Discharge: HOME | End: 2025-05-23
Attending: FAMILY MEDICINE
Payer: COMMERCIAL

## 2025-05-23 VITALS
HEIGHT: 64 IN | HEART RATE: 95 BPM | DIASTOLIC BLOOD PRESSURE: 74 MMHG | WEIGHT: 260 LBS | SYSTOLIC BLOOD PRESSURE: 122 MMHG | BODY MASS INDEX: 44.39 KG/M2

## 2025-05-23 DIAGNOSIS — R06.02 SHORTNESS OF BREATH: ICD-10-CM

## 2025-05-23 LAB
AORTIC ROOT: 2.5 CM
BSA FOR ECHO PROCEDURE: 2.19 M2
FRACTIONAL SHORTENING: 34 (ref 28–44)
INTERVENTRICULAR SEPTUM IN DIASTOLE (PARASTERNAL SHORT AXIS VIEW): 0.8 CM
INTERVENTRICULAR SEPTUM: 0.8 CM (ref 0.6–1.1)
LAAS-AP2: 17.8 CM2
LAAS-AP4: 19.4 CM2
LEFT ATRIUM SIZE: 3.4 CM
LEFT ATRIUM VOLUME (MOD BIPLANE): 51 ML
LEFT ATRIUM VOLUME INDEX (MOD BIPLANE): 23.3 ML/M2
LEFT INTERNAL DIMENSION IN SYSTOLE: 2.9 CM (ref 2.1–4)
LEFT VENTRICULAR INTERNAL DIMENSION IN DIASTOLE: 4.4 CM (ref 3.5–6)
LEFT VENTRICULAR POSTERIOR WALL IN END DIASTOLE: 0.9 CM
LEFT VENTRICULAR STROKE VOLUME: 55 ML
LV EF US.2D.A4C+ESTIMATED: 57 %
LVSV (TEICH): 55 ML
PV MEAN GRADIENT: 10 MMHG
PV MEAN VELOCITY: 154 CM/S
PV PEAK GRADIENT: 18 MMHG
PV PEAK VELOCITY: 210 CM/S
PV VTI: 37.48 CM
RIGHT ATRIAL 2D VOLUME: 43 ML
RIGHT ATRIUM AREA SYSTOLE A4C: 16 CM2
RIGHT VENTRICLE ID DIMENSION: 3.6 CM
SL CV LEFT ATRIUM LENGTH A2C: 5.4 CM
SL CV LV EF: 65
SL CV PED ECHO LEFT VENTRICLE DIASTOLIC VOLUME (MOD BIPLANE) 2D: 89 ML
SL CV PED ECHO LEFT VENTRICLE SYSTOLIC VOLUME (MOD BIPLANE) 2D: 33 ML
TRICUSPID ANNULAR PLANE SYSTOLIC EXCURSION: 2.9 CM

## 2025-05-23 PROCEDURE — 93306 TTE W/DOPPLER COMPLETE: CPT

## 2025-07-25 PROBLEM — G47.33 OSA (OBSTRUCTIVE SLEEP APNEA): Status: ACTIVE | Noted: 2025-07-25

## 2025-08-01 ENCOUNTER — APPOINTMENT (OUTPATIENT)
Dept: LAB | Facility: MEDICAL CENTER | Age: 45
End: 2025-08-01
Payer: COMMERCIAL

## 2025-08-01 DIAGNOSIS — E61.1 IRON DEFICIENCY: ICD-10-CM

## 2025-08-01 DIAGNOSIS — E03.9 HYPOTHYROIDISM, UNSPECIFIED TYPE: ICD-10-CM

## 2025-08-01 DIAGNOSIS — D51.8 OTHER VITAMIN B12 DEFICIENCY ANEMIA: ICD-10-CM

## 2025-08-01 LAB
IRON SATN MFR SERPL: 14 % (ref 15–50)
IRON SERPL-MCNC: 50 UG/DL (ref 50–212)
TIBC SERPL-MCNC: 357 UG/DL (ref 250–450)
TRANSFERRIN SERPL-MCNC: 255 MG/DL (ref 203–362)
UIBC SERPL-MCNC: 307 UG/DL (ref 155–355)

## 2025-08-01 PROCEDURE — 83550 IRON BINDING TEST: CPT

## 2025-08-01 PROCEDURE — 36415 COLL VENOUS BLD VENIPUNCTURE: CPT

## 2025-08-01 PROCEDURE — 83540 ASSAY OF IRON: CPT
